# Patient Record
Sex: MALE | Race: WHITE
[De-identification: names, ages, dates, MRNs, and addresses within clinical notes are randomized per-mention and may not be internally consistent; named-entity substitution may affect disease eponyms.]

---

## 2017-02-24 ENCOUNTER — HOSPITAL ENCOUNTER (OUTPATIENT)
Dept: HOSPITAL 61 - PCVCIMAG | Age: 72
Discharge: HOME | End: 2017-02-24
Attending: INTERNAL MEDICINE
Payer: COMMERCIAL

## 2017-02-24 DIAGNOSIS — E66.9: ICD-10-CM

## 2017-02-24 DIAGNOSIS — E11.9: ICD-10-CM

## 2017-02-24 DIAGNOSIS — R10.84: ICD-10-CM

## 2017-02-24 DIAGNOSIS — G47.33: ICD-10-CM

## 2017-02-24 DIAGNOSIS — I10: Primary | ICD-10-CM

## 2017-02-24 PROCEDURE — 93306 TTE W/DOPPLER COMPLETE: CPT

## 2017-02-24 PROCEDURE — G0463 HOSPITAL OUTPT CLINIC VISIT: HCPCS

## 2017-05-26 ENCOUNTER — HOSPITAL ENCOUNTER (OUTPATIENT)
Dept: HOSPITAL 35 - RAD | Age: 72
End: 2017-05-26
Attending: INTERNAL MEDICINE
Payer: COMMERCIAL

## 2017-05-26 DIAGNOSIS — R10.9: Primary | ICD-10-CM

## 2017-06-06 ENCOUNTER — HOSPITAL ENCOUNTER (OUTPATIENT)
Dept: HOSPITAL 61 - PCVCCLINIC | Age: 72
Discharge: HOME | End: 2017-06-06
Attending: INTERNAL MEDICINE
Payer: COMMERCIAL

## 2017-06-06 DIAGNOSIS — R94.31: ICD-10-CM

## 2017-06-06 DIAGNOSIS — Z79.82: ICD-10-CM

## 2017-06-06 DIAGNOSIS — G47.33: ICD-10-CM

## 2017-06-06 DIAGNOSIS — I12.9: ICD-10-CM

## 2017-06-06 DIAGNOSIS — Z79.899: ICD-10-CM

## 2017-06-06 DIAGNOSIS — Z79.84: ICD-10-CM

## 2017-06-06 DIAGNOSIS — K55.1: Primary | ICD-10-CM

## 2017-06-06 DIAGNOSIS — N18.9: ICD-10-CM

## 2017-06-06 DIAGNOSIS — F17.200: ICD-10-CM

## 2017-06-06 DIAGNOSIS — E11.22: ICD-10-CM

## 2017-06-06 DIAGNOSIS — K21.9: ICD-10-CM

## 2017-06-06 DIAGNOSIS — R10.84: ICD-10-CM

## 2017-06-06 DIAGNOSIS — Z90.49: ICD-10-CM

## 2017-06-06 DIAGNOSIS — E78.00: ICD-10-CM

## 2017-06-06 DIAGNOSIS — I49.3: ICD-10-CM

## 2017-06-06 PROCEDURE — G0463 HOSPITAL OUTPT CLINIC VISIT: HCPCS

## 2017-06-06 PROCEDURE — 93005 ELECTROCARDIOGRAM TRACING: CPT

## 2017-06-06 PROCEDURE — 80061 LIPID PANEL: CPT

## 2017-06-19 ENCOUNTER — HOSPITAL ENCOUNTER (OUTPATIENT)
Dept: HOSPITAL 61 - PCVCINTER | Age: 72
Discharge: HOME | End: 2017-06-19
Attending: RADIOLOGY
Payer: COMMERCIAL

## 2017-06-19 DIAGNOSIS — I70.1: ICD-10-CM

## 2017-06-19 DIAGNOSIS — K55.1: ICD-10-CM

## 2017-06-19 DIAGNOSIS — K55.059: Primary | ICD-10-CM

## 2017-06-19 DIAGNOSIS — I10: ICD-10-CM

## 2017-06-19 PROCEDURE — C1876 STENT, NON-COA/NON-COV W/DEL: HCPCS

## 2017-06-19 PROCEDURE — 93458 L HRT ARTERY/VENTRICLE ANGIO: CPT

## 2017-06-19 PROCEDURE — C1769 GUIDE WIRE: HCPCS

## 2017-06-19 PROCEDURE — C1887 CATHETER, GUIDING: HCPCS

## 2017-06-19 PROCEDURE — C1751 CATH, INF, PER/CENT/MIDLINE: HCPCS

## 2017-06-19 PROCEDURE — 99153 MOD SED SAME PHYS/QHP EA: CPT

## 2017-06-19 PROCEDURE — 37236 OPEN/PERQ PLACE STENT 1ST: CPT

## 2017-06-19 PROCEDURE — 36252 INS CATH REN ART 1ST BILAT: CPT

## 2017-06-19 PROCEDURE — 36245 INS CATH ABD/L-EXT ART 1ST: CPT

## 2017-06-19 PROCEDURE — 99152 MOD SED SAME PHYS/QHP 5/>YRS: CPT

## 2017-06-19 PROCEDURE — 37237 OPEN/PERQ PLACE STENT EA ADD: CPT

## 2017-06-19 PROCEDURE — 75726 ARTERY X-RAYS ABDOMEN: CPT

## 2017-06-19 PROCEDURE — C1894 INTRO/SHEATH, NON-LASER: HCPCS

## 2017-06-19 PROCEDURE — C1725 CATH, TRANSLUMIN NON-LASER: HCPCS

## 2017-06-19 PROCEDURE — 76937 US GUIDE VASCULAR ACCESS: CPT

## 2017-06-19 NOTE — PCVCINTER
EXAM:

1. AORTOGRAM AND BILATERAL ILIOFEMORAL ANGIOGRAPHY

2. BILATERAL RENAL ANGIOGRAPHY

3. COMPLETE MESENTERIC ANGIOGRAPHY

4. INFERIOR MESENTERIC ARTERY STENT PLACEMENT

5. SUPERIOR MESENTERIC ARTERY STENT PLACEMENT



INDICATION: Chronic mesenteric ischemia. Postprandial abdominal pain.

Mesenteric atherosclerosis.. Hypertension. Renal atherosclerosis.



PROCEDURE: Procedure and risks of the procedures listed above were

discussed with the patient and consent obtained. Risks including but

not limited to bleeding, infection, stroke, vascular injury,

neurologic injury, embolization, allergic reactions, bowel ischemia

requiring resection, and contrast-induced nephropathy requiring

dialysis were discussed as appropriate and consent obtained. Patient

was placed on the angiography table. IV conscious sedation was

utilized with appropriate monitoring for 90 minutes. The right groin

was prepped and draped in the normal sterile fashion. Ultrasound was

used to interrogate the right groin and demonstrate the right common

femoral artery. An ultrasound image was saved. Under ultrasound

guidance a 21 gauge needle was used to gain access into the right

common femoral artery and a 5F vascular sheath was placed. Catheter

was placed into the suprarenal abdominal aorta and abdominal aortic

angiogram performed. Catheter was placed into the distal abdominal

aorta and bilateral iliofemoral angiography performed. Catheter was

placed into the right renal arteries and right renal angiograms

performed. Catheter was placed into the left renal arteries and left

renal angiograms performed.  Catheter was placed into the celiac axis

and celiac angiogram performed.  Catheter was placed in the superior

mesenteric artery and SMA angiogram performed.  Catheter was placed

into the inferior mesenteric artery and SERG angiogram performed.

Patient was given 4500 units of heparin. Groin sheath was upsized to 6

Turkish. I placed a 5 x 12 Palmaz blue stent across the area of

high-grade stenosis at the origin of the inferior mesenteric artery.

Follow-up angiogram was performed. I then placed a 7 x 15 Palmaz blue

stent across area of stenosis in the proximal superior mesenteric

artery with subsequent dilatation up to 7.5 mm. Dr. Coffey joined the

procedure and he performed coronary angiography.  Please see his

separate dictation for details.  Catheters and wires removed. Sheath

was removed and hemostasis obtained using the Mynx device. No

immediate complications.



FINDINGS:

Aortogram: There is one right and one left renal artery. The celiac

axis is patent. Moderate calcific plaque infrarenal abdominal aorta

without significant stenosis.

Bilateral iliofemoral angiography: The right and left common iliac and

external iliac arteries show good patency. The right and left internal

iliac arteries are patent. The right and left common femoral and

profunda femoral arteries are patent. The upper superficial femoral

arteries are patent.

Right renal artery: Moderate plaque proximal vessel does not cause

significant stenosis. No branch vessel stenosis.

Left renal artery: Mild plaque proximal vessel does not cause

significant stenosis. No branch vessel stenosis.

Celiac axis: Moderate calcific plaque proximal vessel does not cause

significant stenosis. Distal branches are patent. Celiac to SMA

collaterals are noted.

Superior mesenteric artery: Moderately extensive calcific plaque

proximal vessel results in 80% stenosis which is felt to be

flow-limiting. The more distal vessel is patent as are its distal

branches.

Inferior mesenteric artery: Moderate plaque at the origin the vessel

results in 90% stenosis. Distal branches are patent.

Superior mesenteric artery: Following stent placement as above vessel

shows satisfactory patency with only slight recoil of the stent

proximally in an area of heavy plaque.

Inferior mesenteric artery: Following stent placement vessel shows

satisfactory patency with only slight recoil of the stent proximally. 



IMPRESSION:

80% stenosis superior mesenteric artery was treated as above with

satisfactory patency restored.

90% stenosis at the origin of the inferior mesenteric artery was

treated as above with satisfactory patency restored.

The celiac axis shows satisfactory patency.

No significant aortoiliac or iliofemoral stenosis.



LOC:[id number]

## 2017-09-22 ENCOUNTER — HOSPITAL ENCOUNTER (OUTPATIENT)
Dept: HOSPITAL 61 - PCVCIMAG | Age: 72
Discharge: HOME | End: 2017-09-22
Attending: INTERNAL MEDICINE
Payer: COMMERCIAL

## 2017-09-22 DIAGNOSIS — I25.10: ICD-10-CM

## 2017-09-22 DIAGNOSIS — I73.9: ICD-10-CM

## 2017-09-22 DIAGNOSIS — Z95.828: ICD-10-CM

## 2017-09-22 DIAGNOSIS — I77.4: ICD-10-CM

## 2017-09-22 DIAGNOSIS — G47.33: ICD-10-CM

## 2017-09-22 DIAGNOSIS — K55.1: Primary | ICD-10-CM

## 2017-09-22 PROCEDURE — 93975 VASCULAR STUDY: CPT

## 2017-09-22 NOTE — PCVCIMAG
EXAM: MESENTERIC ARTERIAL DUPLEX



INDICATION: Mesenteric Atherosclerosis.



FINDINGS: 

Celiac Axis: 60-70% stenosis in the celiac axis.

Superior Mesenteric Artery: No flow limiting stenosis. No branch

vessel stenosis. Prior stent is patent.

Inferior Mesenteric Artery: No flow limiting stenosis. No branch

vessel stenosis. Prior stent is patent.



Mesenteric veins are patent where seen.



IMPRESSION: 

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

Previous superior and inferior mesenteric artery stents remain patent.



LOC:OFFICE

## 2017-10-20 ENCOUNTER — HOSPITAL ENCOUNTER (OUTPATIENT)
Dept: HOSPITAL 35 - GI | Age: 72
Discharge: HOME | End: 2017-10-20
Attending: SPECIALIST
Payer: COMMERCIAL

## 2017-10-20 VITALS — BODY MASS INDEX: 35 KG/M2 | HEIGHT: 70.98 IN | WEIGHT: 250 LBS

## 2017-10-20 DIAGNOSIS — I10: ICD-10-CM

## 2017-10-20 DIAGNOSIS — E11.9: ICD-10-CM

## 2017-10-20 DIAGNOSIS — Z87.891: ICD-10-CM

## 2017-10-20 DIAGNOSIS — K21.9: ICD-10-CM

## 2017-10-20 DIAGNOSIS — F41.9: ICD-10-CM

## 2017-10-20 DIAGNOSIS — E78.00: ICD-10-CM

## 2017-10-20 DIAGNOSIS — Z98.890: ICD-10-CM

## 2017-10-20 DIAGNOSIS — I73.9: ICD-10-CM

## 2017-10-20 DIAGNOSIS — F32.9: ICD-10-CM

## 2017-10-20 DIAGNOSIS — K31.9: Primary | ICD-10-CM

## 2017-10-20 PROCEDURE — 62110: CPT

## 2017-12-29 ENCOUNTER — HOSPITAL ENCOUNTER (OUTPATIENT)
Dept: HOSPITAL 61 - PCVCIMAG | Age: 72
Discharge: HOME | End: 2017-12-29
Attending: INTERNAL MEDICINE
Payer: COMMERCIAL

## 2017-12-29 DIAGNOSIS — I77.1: ICD-10-CM

## 2017-12-29 DIAGNOSIS — E11.22: ICD-10-CM

## 2017-12-29 DIAGNOSIS — Z95.828: ICD-10-CM

## 2017-12-29 DIAGNOSIS — I65.23: Primary | ICD-10-CM

## 2017-12-29 DIAGNOSIS — I12.9: ICD-10-CM

## 2017-12-29 DIAGNOSIS — N18.9: ICD-10-CM

## 2017-12-29 PROCEDURE — 93975 VASCULAR STUDY: CPT

## 2017-12-29 PROCEDURE — 93880 EXTRACRANIAL BILAT STUDY: CPT

## 2019-01-30 ENCOUNTER — HOSPITAL ENCOUNTER (OUTPATIENT)
Dept: HOSPITAL 61 - PCVCCLINIC | Age: 74
Discharge: HOME | End: 2019-01-30
Attending: INTERNAL MEDICINE
Payer: COMMERCIAL

## 2019-01-30 DIAGNOSIS — I25.10: Primary | ICD-10-CM

## 2019-01-30 DIAGNOSIS — I65.23: ICD-10-CM

## 2019-01-30 DIAGNOSIS — N18.9: ICD-10-CM

## 2019-01-30 DIAGNOSIS — E11.22: ICD-10-CM

## 2019-01-30 DIAGNOSIS — K55.1: ICD-10-CM

## 2019-01-30 DIAGNOSIS — Z79.899: ICD-10-CM

## 2019-01-30 DIAGNOSIS — E78.00: ICD-10-CM

## 2019-01-30 DIAGNOSIS — R10.9: ICD-10-CM

## 2019-01-30 DIAGNOSIS — Z87.891: ICD-10-CM

## 2019-01-30 DIAGNOSIS — G89.29: ICD-10-CM

## 2019-01-30 PROCEDURE — G0463 HOSPITAL OUTPT CLINIC VISIT: HCPCS

## 2019-01-30 PROCEDURE — 80061 LIPID PANEL: CPT

## 2019-01-30 PROCEDURE — 36415 COLL VENOUS BLD VENIPUNCTURE: CPT

## 2019-01-30 PROCEDURE — 93005 ELECTROCARDIOGRAM TRACING: CPT

## 2019-03-08 ENCOUNTER — HOSPITAL ENCOUNTER (OUTPATIENT)
Dept: HOSPITAL 61 - PCVCIMAG | Age: 74
Discharge: HOME | End: 2019-03-08
Attending: INTERNAL MEDICINE
Payer: COMMERCIAL

## 2019-03-08 DIAGNOSIS — E11.9: ICD-10-CM

## 2019-03-08 DIAGNOSIS — I10: ICD-10-CM

## 2019-03-08 DIAGNOSIS — E78.5: ICD-10-CM

## 2019-03-08 DIAGNOSIS — I65.23: Primary | ICD-10-CM

## 2019-03-08 DIAGNOSIS — I77.9: ICD-10-CM

## 2019-03-08 DIAGNOSIS — K55.1: ICD-10-CM

## 2019-03-08 DIAGNOSIS — I25.10: ICD-10-CM

## 2019-03-08 DIAGNOSIS — I25.84: ICD-10-CM

## 2019-03-08 DIAGNOSIS — E78.00: ICD-10-CM

## 2019-03-08 PROCEDURE — 93351 STRESS TTE COMPLETE: CPT

## 2019-03-08 PROCEDURE — 93975 VASCULAR STUDY: CPT

## 2019-03-08 PROCEDURE — 93325 DOPPLER ECHO COLOR FLOW MAPG: CPT

## 2019-03-08 PROCEDURE — 93880 EXTRACRANIAL BILAT STUDY: CPT

## 2019-03-08 NOTE — PCVCIMAG
EXAM: MESENTERIC ARTERIAL DUPLEX



INDICATION: Mesenteric Atherosclerosis.



FINDINGS: 

Celiac Axis: No flow limiting stenosis. No branch vessel stenosis.

Superior Mesenteric Artery: 50-60% restenosis proximal vessel with the

prior stent.  No branch vessel stenosis.

Inferior Mesenteric Artery: No flow limiting stenosis. No branch

vessel stenosis.



Mesenteric veins are patent where seen.



IMPRESSION: 

Celiac axis is patent.

50-60% restenosis proximal superior mesenteric artery within prior

stent not felt be critically flow-limiting.

Inferior mesenteric artery stent maintaining satisfactory patency.



LOC:[id number]

## 2019-03-08 NOTE — PCVCIMAG
--------------- APPROVED REPORT 

--------------





Indications

Stenosis



Doppler Spectral Velocity Analysis

PSV  /  EDVPSV  /  EDV

ECA (R)     96 / 14 cm/sECA (L)     105 / 8 cm/s



dICA (R)    58 / 14 cm/sdICA (L)     110 / 26 cm/s

Tolu (R)     76 / 20 cm/smICA (L)     98 / 19 cm/s

pICA (R)     111 / 12 cm/spICA (L)     129 / 15 cm/s



Bulb (R)        92 / 19 cm/sBulb (L)         137 / 21 cm/s



dCCA (R)     141 / 23 cm/sdCCA (L)     121 / 15 cm/s

mCCA (R)    131 / 16 cm/smCCA (L)    141 / 19 cm/s



Vert (R)     51 / 7 cm/sVert (L)     56 / 10 cm/s

ICA/CCA     0.79ICA/CCA     1.07



Findings

The right carotid bulb has moderate calcified plaque.

The right proximal internal carotid artery shows <40% 

stenosis.

The right common carotid artery shows no significant stenosis.

The right external carotid artery shows no significant stenosis.

The left carotid bulb has moderate calcified plaque.

The left proximal internal carotid artery shows 40-50% stenosis.

The left common carotid artery shows no significant stenosis.

The left external carotid artery shows no significant 

stenosis.



Conclusion

1.  Right internal carotid artery stenosis (<40%)

2.  Left internal carotid artery stenosis (40-50%)

3.  Antegrade vertebral flow

## 2019-03-11 NOTE — PCVCIMAG
--------------- APPROVED REPORT --------------





Study performed:  03/08/2019 09:41:23



Exam:  Stress Echocardiogram

Indication: CAD-moderate,dm, htn, hlp

Patient Location: Echo lab

Stress Nurse: Dorothy Stevens RN

Status: routine



Ht: 5 ft 11 in  

HR: 76 bpm      BP: 128/68 mmHg

Rhythm: NSR



Procedure

The patient underwent an Exercise Stress Test using the Kyree 

Protocol. Blood pressure, heart rate, and EKG were monitored.

An Echocardiogram was performed by technician in four stages in quad 

fashion.  At peak stress, four selected images were obtained and 

placed side by side with resting images for comparison.



Stress Test Details

Stress Test:  Exercise stress testing was performed using a Kyree 

protocol.

HR

Resting HR:            76 bpmMax Heart Rate (APMHR): 145 bpm 

Max HR Achieved:  93 bpmTarget HR (85% APMHR): 123 bpm

% of APMHR:         64

Recovery HR:            78 bpm

HR response to stress: Normal HR response to stress



BP

Resting BP:  128/68 mmHg

Max BP:       158/80 mmHg

Recovery BP:       140/80 mmHg

BP response to stress: Normal blood pressure response to 

stress.

ECG

Resting ECG:  Sinus Rhythm

Stress ECG:     Sinus Rhythm

ST Change: Normal

Arrhythmia:    None

Recovery ECG: Sinus Rhythm

Recovery ST Change: Normal

Recovery Arrhythmia: None



Clinical

Reason for Termination: Dyspnea, Maximal effort, leg fatigue

Stress Symptoms: limiting Dyspnea, leg fatigue

Exercise duration: 3 min 27 sec

Highest Stage Achieved: Stage 2: 2.5 mph at 12% grade. 

Exercise capacity: 5.6 METs

Overall Exercise Capacity for Age: Poor

Scale: Sedentary

Angina Score: None



Stress ECG Conclusion

Non-diagnostic exercise stress due to failure to attain target HR. 

Submaximal stress test.



Pre-Stress Echo

The resting Echocardiogram showed normal left ventricular 

contractility with an estimated Ejection Fraction of about 50-55%. 

Normal wall motion in all segments on baseline images.



Post-Stress Echo

The stress Echocardiogram showed normal left ventricular 

contractility with an estimated Ejection Fraction of about 60-65%. 

Normal augmentation of wall motion in all segments on post stress 

images.



Clinical

No clinical or ECG evidence for ischemia.



Conclusion

Clinical Response:  Non-ischemic

Exercise Capacity:  Below Average

Stress ECG Response:  Equivocal

Stress Echo Images:  Non-ischemic

Non-diagnostic study due to inability of the patient to achieve 85% 

of maximal HR. Limiting dyspnea and leg fatigue resulting in on 3:27 

mins of duration of exercise and peak heart rate of only 64% of 

target.



Other Information

Study Quality: Fair



<Conclusion>

Non-diagnostic study due to inability of the patient to achieve 85% 

of maximal HR. Limiting dyspnea and leg fatigue resulting in on 3:27 

mins of duration of exercise and peak heart rate of only 64% of 

target.

## 2019-10-01 ENCOUNTER — HOSPITAL ENCOUNTER (OUTPATIENT)
Dept: HOSPITAL 61 - PCVCCLINIC | Age: 74
Discharge: HOME | End: 2019-10-01
Attending: INTERNAL MEDICINE
Payer: COMMERCIAL

## 2019-10-01 DIAGNOSIS — E66.9: ICD-10-CM

## 2019-10-01 DIAGNOSIS — Z79.899: ICD-10-CM

## 2019-10-01 DIAGNOSIS — I65.23: ICD-10-CM

## 2019-10-01 DIAGNOSIS — Z79.82: ICD-10-CM

## 2019-10-01 DIAGNOSIS — I10: ICD-10-CM

## 2019-10-01 DIAGNOSIS — E11.9: ICD-10-CM

## 2019-10-01 DIAGNOSIS — R10.9: ICD-10-CM

## 2019-10-01 DIAGNOSIS — E78.00: ICD-10-CM

## 2019-10-01 DIAGNOSIS — I25.10: Primary | ICD-10-CM

## 2019-10-01 DIAGNOSIS — K21.9: ICD-10-CM

## 2019-10-01 DIAGNOSIS — K55.1: ICD-10-CM

## 2019-10-01 PROCEDURE — 93005 ELECTROCARDIOGRAM TRACING: CPT

## 2019-10-01 PROCEDURE — G0463 HOSPITAL OUTPT CLINIC VISIT: HCPCS

## 2019-10-01 PROCEDURE — 80061 LIPID PANEL: CPT

## 2019-10-01 PROCEDURE — 36415 COLL VENOUS BLD VENIPUNCTURE: CPT

## 2019-10-07 ENCOUNTER — HOSPITAL ENCOUNTER (OUTPATIENT)
Dept: HOSPITAL 61 - PCVCIMAG | Age: 74
Discharge: HOME | End: 2019-10-07
Attending: INTERNAL MEDICINE
Payer: COMMERCIAL

## 2019-10-07 DIAGNOSIS — K58.9: ICD-10-CM

## 2019-10-07 DIAGNOSIS — K55.1: Primary | ICD-10-CM

## 2019-10-07 DIAGNOSIS — E78.00: ICD-10-CM

## 2019-10-07 DIAGNOSIS — G89.29: ICD-10-CM

## 2019-10-07 PROCEDURE — 93975 VASCULAR STUDY: CPT

## 2019-10-07 NOTE — PCVCIMAG
EXAM: MESENTERIC ARTERIAL DUPLEX



INDICATION: Mesenteric Atherosclerosis.



FINDINGS: 

Celiac Axis: Mild stenosis. No branch vessel stenosis.

Superior Mesenteric Artery: 60-70% restenosis proximal/mid vessel

within prior stent.  No branch vessel stenosis.

Inferior Mesenteric Artery: No flow limiting stenosis. No branch

vessel stenosis.



Mesenteric veins are patent where seen.



IMPRESSION: 

60-70% restenosis proximal superior mesenteric artery within prior

stent.  Please correlate clinically.

Previous inferior mesenteric artery stent maintaining good patency.



LOC:[id number]

## 2019-11-06 ENCOUNTER — HOSPITAL ENCOUNTER (OUTPATIENT)
Dept: HOSPITAL 35 - CATH | Age: 74
Discharge: HOME | End: 2019-11-06
Attending: RADIOLOGY
Payer: COMMERCIAL

## 2019-11-06 VITALS — BODY MASS INDEX: 33.59 KG/M2 | WEIGHT: 248 LBS | HEIGHT: 72 IN

## 2019-11-06 VITALS — DIASTOLIC BLOOD PRESSURE: 62 MMHG | SYSTOLIC BLOOD PRESSURE: 138 MMHG

## 2019-11-06 DIAGNOSIS — K55.1: Primary | ICD-10-CM

## 2019-11-06 DIAGNOSIS — K55.059: ICD-10-CM

## 2019-11-06 DIAGNOSIS — Z90.49: ICD-10-CM

## 2019-11-06 DIAGNOSIS — Z87.891: ICD-10-CM

## 2019-11-06 DIAGNOSIS — E78.5: ICD-10-CM

## 2019-11-06 DIAGNOSIS — I10: ICD-10-CM

## 2019-11-06 DIAGNOSIS — E11.9: ICD-10-CM

## 2019-11-06 DIAGNOSIS — E78.00: ICD-10-CM

## 2019-11-06 DIAGNOSIS — I70.1: ICD-10-CM

## 2019-11-06 DIAGNOSIS — Z79.82: ICD-10-CM

## 2019-11-06 DIAGNOSIS — J44.9: ICD-10-CM

## 2019-11-06 DIAGNOSIS — Z79.899: ICD-10-CM

## 2019-11-06 DIAGNOSIS — Z98.890: ICD-10-CM

## 2019-11-06 DIAGNOSIS — I73.9: ICD-10-CM

## 2019-11-06 DIAGNOSIS — K21.9: ICD-10-CM

## 2019-11-06 DIAGNOSIS — F32.9: ICD-10-CM

## 2019-11-06 LAB
ANION GAP SERPL CALC-SCNC: 9 MMOL/L (ref 7–16)
BUN SERPL-MCNC: 21 MG/DL (ref 7–18)
CALCIUM SERPL-MCNC: 9.7 MG/DL (ref 8.5–10.1)
CHLORIDE SERPL-SCNC: 97 MMOL/L (ref 98–107)
CO2 SERPL-SCNC: 31 MMOL/L (ref 21–32)
CREAT SERPL-MCNC: 1.4 MG/DL (ref 0.7–1.3)
ERYTHROCYTE [DISTWIDTH] IN BLOOD BY AUTOMATED COUNT: 15 % (ref 10.5–14.5)
GLUCOSE SERPL-MCNC: 135 MG/DL (ref 74–106)
HCT VFR BLD CALC: 41.1 % (ref 42–52)
HGB BLD-MCNC: 13.5 GM/DL (ref 14–18)
MCH RBC QN AUTO: 28 PG (ref 26–34)
MCHC RBC AUTO-ENTMCNC: 32.7 G/DL (ref 28–37)
MCV RBC: 85.7 FL (ref 80–100)
PLATELET # BLD: 193 THOU/UL (ref 150–400)
POTASSIUM SERPL-SCNC: 4.2 MMOL/L (ref 3.5–5.1)
RBC # BLD AUTO: 4.8 MIL/UL (ref 4.5–6)
SODIUM SERPL-SCNC: 137 MMOL/L (ref 136–145)
WBC # BLD AUTO: 8.7 THOU/UL (ref 4–11)

## 2020-04-02 ENCOUNTER — HOSPITAL ENCOUNTER (OUTPATIENT)
Dept: HOSPITAL 35 - SJCVCIMAG | Age: 75
End: 2020-04-02
Attending: INTERNAL MEDICINE
Payer: COMMERCIAL

## 2020-04-02 DIAGNOSIS — I70.1: Primary | ICD-10-CM

## 2020-04-02 DIAGNOSIS — I65.29: ICD-10-CM

## 2020-05-07 ENCOUNTER — HOSPITAL ENCOUNTER (OUTPATIENT)
Dept: HOSPITAL 35 - SJCVCIMAG | Age: 75
End: 2020-05-07
Attending: INTERNAL MEDICINE
Payer: COMMERCIAL

## 2020-05-07 DIAGNOSIS — R06.09: ICD-10-CM

## 2020-05-07 DIAGNOSIS — E78.5: ICD-10-CM

## 2020-05-07 DIAGNOSIS — E11.9: ICD-10-CM

## 2020-05-07 DIAGNOSIS — Z87.891: ICD-10-CM

## 2020-05-07 DIAGNOSIS — Z79.82: ICD-10-CM

## 2020-05-07 DIAGNOSIS — I73.9: ICD-10-CM

## 2020-05-07 DIAGNOSIS — I25.10: Primary | ICD-10-CM

## 2020-05-07 DIAGNOSIS — I10: ICD-10-CM

## 2020-05-07 DIAGNOSIS — I25.84: ICD-10-CM

## 2020-05-14 ENCOUNTER — HOSPITAL ENCOUNTER (OUTPATIENT)
Dept: HOSPITAL 35 - CATH | Age: 75
Setting detail: OBSERVATION
LOS: 1 days | Discharge: HOME | End: 2020-05-15
Attending: INTERNAL MEDICINE | Admitting: INTERNAL MEDICINE
Payer: COMMERCIAL

## 2020-05-14 VITALS — HEIGHT: 70.98 IN | BODY MASS INDEX: 34.86 KG/M2 | WEIGHT: 249 LBS

## 2020-05-14 VITALS — DIASTOLIC BLOOD PRESSURE: 75 MMHG | SYSTOLIC BLOOD PRESSURE: 154 MMHG

## 2020-05-14 VITALS — DIASTOLIC BLOOD PRESSURE: 68 MMHG | SYSTOLIC BLOOD PRESSURE: 146 MMHG

## 2020-05-14 VITALS — SYSTOLIC BLOOD PRESSURE: 176 MMHG | DIASTOLIC BLOOD PRESSURE: 78 MMHG

## 2020-05-14 VITALS — SYSTOLIC BLOOD PRESSURE: 153 MMHG | DIASTOLIC BLOOD PRESSURE: 68 MMHG

## 2020-05-14 VITALS — SYSTOLIC BLOOD PRESSURE: 146 MMHG | DIASTOLIC BLOOD PRESSURE: 72 MMHG

## 2020-05-14 VITALS — DIASTOLIC BLOOD PRESSURE: 69 MMHG | SYSTOLIC BLOOD PRESSURE: 139 MMHG

## 2020-05-14 VITALS — DIASTOLIC BLOOD PRESSURE: 64 MMHG | SYSTOLIC BLOOD PRESSURE: 167 MMHG

## 2020-05-14 VITALS — DIASTOLIC BLOOD PRESSURE: 72 MMHG | SYSTOLIC BLOOD PRESSURE: 156 MMHG

## 2020-05-14 VITALS — SYSTOLIC BLOOD PRESSURE: 148 MMHG | DIASTOLIC BLOOD PRESSURE: 85 MMHG

## 2020-05-14 VITALS — DIASTOLIC BLOOD PRESSURE: 75 MMHG | SYSTOLIC BLOOD PRESSURE: 151 MMHG

## 2020-05-14 DIAGNOSIS — E78.00: ICD-10-CM

## 2020-05-14 DIAGNOSIS — I10: ICD-10-CM

## 2020-05-14 DIAGNOSIS — I65.29: ICD-10-CM

## 2020-05-14 DIAGNOSIS — I70.213: Primary | ICD-10-CM

## 2020-05-14 DIAGNOSIS — I25.10: ICD-10-CM

## 2020-05-14 DIAGNOSIS — E78.5: ICD-10-CM

## 2020-05-14 LAB
ANION GAP SERPL CALC-SCNC: 8 MMOL/L (ref 7–16)
BUN SERPL-MCNC: 26 MG/DL (ref 7–18)
CALCIUM SERPL-MCNC: 8.8 MG/DL (ref 8.5–10.1)
CHLORIDE SERPL-SCNC: 95 MMOL/L (ref 98–107)
CO2 SERPL-SCNC: 31 MMOL/L (ref 21–32)
CREAT SERPL-MCNC: 1.7 MG/DL (ref 0.7–1.3)
ERYTHROCYTE [DISTWIDTH] IN BLOOD BY AUTOMATED COUNT: 15 % (ref 10.5–14.5)
GLUCOSE SERPL-MCNC: 126 MG/DL (ref 74–106)
HCT VFR BLD CALC: 39.6 % (ref 42–52)
HGB BLD-MCNC: 13.3 GM/DL (ref 14–18)
MCH RBC QN AUTO: 28.3 PG (ref 26–34)
MCHC RBC AUTO-ENTMCNC: 33.5 G/DL (ref 28–37)
MCV RBC: 84.4 FL (ref 80–100)
PLATELET # BLD: 208 THOU/UL (ref 150–400)
POTASSIUM SERPL-SCNC: 4 MMOL/L (ref 3.5–5.1)
RBC # BLD AUTO: 4.69 MIL/UL (ref 4.5–6)
SODIUM SERPL-SCNC: 134 MMOL/L (ref 136–145)
WBC # BLD AUTO: 9.7 THOU/UL (ref 4–11)

## 2020-05-15 VITALS — DIASTOLIC BLOOD PRESSURE: 47 MMHG | SYSTOLIC BLOOD PRESSURE: 119 MMHG

## 2020-05-15 VITALS — DIASTOLIC BLOOD PRESSURE: 78 MMHG | SYSTOLIC BLOOD PRESSURE: 107 MMHG

## 2020-05-15 VITALS — SYSTOLIC BLOOD PRESSURE: 107 MMHG | DIASTOLIC BLOOD PRESSURE: 78 MMHG

## 2020-05-15 VITALS — DIASTOLIC BLOOD PRESSURE: 66 MMHG | SYSTOLIC BLOOD PRESSURE: 167 MMHG

## 2020-05-15 LAB
ALBUMIN SERPL-MCNC: 3.7 G/DL (ref 3.4–5)
ALT SERPL-CCNC: 24 U/L (ref 30–65)
ANION GAP SERPL CALC-SCNC: 8 MMOL/L (ref 7–16)
AST SERPL-CCNC: 18 U/L (ref 15–37)
BILIRUB SERPL-MCNC: 0.5 MG/DL
BUN SERPL-MCNC: 27 MG/DL (ref 7–18)
CALCIUM SERPL-MCNC: 8.4 MG/DL (ref 8.5–10.1)
CHLORIDE SERPL-SCNC: 101 MMOL/L (ref 98–107)
CO2 SERPL-SCNC: 30 MMOL/L (ref 21–32)
CREAT SERPL-MCNC: 1.8 MG/DL (ref 0.7–1.3)
ERYTHROCYTE [DISTWIDTH] IN BLOOD BY AUTOMATED COUNT: 14.9 % (ref 10.5–14.5)
GLUCOSE SERPL-MCNC: 232 MG/DL (ref 74–106)
HCT VFR BLD CALC: 37.7 % (ref 42–52)
HGB BLD-MCNC: 12.7 GM/DL (ref 14–18)
MCH RBC QN AUTO: 28.4 PG (ref 26–34)
MCHC RBC AUTO-ENTMCNC: 33.6 G/DL (ref 28–37)
MCV RBC: 84.5 FL (ref 80–100)
PLATELET # BLD: 169 THOU/UL (ref 150–400)
POTASSIUM SERPL-SCNC: 3.9 MMOL/L (ref 3.5–5.1)
PROT SERPL-MCNC: 6.8 G/DL (ref 6.4–8.2)
RBC # BLD AUTO: 4.47 MIL/UL (ref 4.5–6)
SODIUM SERPL-SCNC: 139 MMOL/L (ref 136–145)
TROPONIN I SERPL-MCNC: 0.2 NG/ML (ref ?–0.06)
WBC # BLD AUTO: 9.1 THOU/UL (ref 4–11)

## 2020-05-15 NOTE — EKG
[facility]
[place]   [address]                     ELECTROCARDIOGRAM REPORT      
_______________________________________________________________________________
 
Name:       KEYANA GOINS              Room #:         219-[facility] 
M.R.#:      [id number]                       Account #:      [id number]  
Admission:  20    Attend Phys:    Toan Bell MD,
Discharge:              Date of Birth:  [birth year]  
                                                          Report #: [id number]
                                                                    [id number]
_______________________________________________________________________________
THIS REPORT FOR:  
 
cc:  Ana Cristina Vasquez MD, Jennifer S. MD Couchonnal, Luis F. MD                                            ~
THIS REPORT FOR:   //name//                          
 
                          [facility]
                                       
Test Date:    2020               Test Time:    11:03:16
Pat Name:     KEYANA GOINS           Department:   
Patient ID:   [id number]            Room:         219
Gender:       M                        Technician:   KHUSHBU CONNELLY
:          1945               Requested By: Toan Bell
Order Number: [id number] MD:   Quirino Castro
                                 Measurements
Intervals                              Axis          
Rate:         51                       P:            55
VT:           208                      QRS:          -8
QRSD:         93                       T:            79
QT:           455                                    
QTc:          420                                    
                           Interpretive Statements
Sinus rhythm
Inferior infarct, old
Lateral leads are also involved
Compared to ECG 2016 08:54:06
Myocardial infarct finding now present
 
Electronically Signed On 5- 8:18:40 CDT by Quirino Castro
[email or web address]
 
 
 
 
 
 
 
 
 
 
 
 
 
  <ELECTRONICALLY SIGNED>
   By: Quirino Castro MD        
  05/15/20     0818
D: 20                           _____________________________________
T: 20 1103                           Quirino Castro MD          /EPI

## 2020-05-17 NOTE — CATHLAB
[facility]
[address] [place]   [address]                   INVASIVE PROCEDURE REPORT     
_______________________________________________________________________________
 
Name:       KEYANA GOINS              Room #:         219-P       [facility] Prem JACOBS#:      [id number]                       Account #:      [id number]  
Admission:  05/14/20    Attend Phys:    Toan Bell MD,
Discharge:  05/15/20    Date of Birth:  [birth year]  
                                                          Report #: [id number]
                                                                    [id number]
_______________________________________________________________________________
THIS REPORT FOR:  
 
cc:  Ana Cristina Vasquez MD, Jennifer S. MD Mancuso, Gerald M. MD [facility]                                        
                                                                       ~
 
--------------- APPROVED REPORT --------------
 
 
Study performed:  05/14/2020 08:45:53
 
Patient Details
Patient Status: Out-Patient                  Room #: 
The patient is a 74 year-old male
 
Event Personnel
Toan Bell  Interventional Cardiologist, Mercedes Faulkner RN RN, 
Lorena Muñoz RTR, Linwood Thomas Ja'net RTR 
Monitor
 
Procedures Performed
Left Heart Cath w/or w/o Coronaries [id number] [facility] SAMEERA Place w/wo Plasty 
Single RCA 396813 Art Access - R femoral artery*  01787 Initial Mod 
Sed Same Phys/QHP Gr5y 418781 39597 Mod Sed Same Phys/QHP Ea 
[id number]
 
Indication
Chest pain
 
Procedure Narrative
The patient was brought electively to the Cardiac Catheterization 
Laboratory and was prepped and draped in a sterile manner. A SHEATH 
BRITE-TIP 6F X 11CM ([id number]) sheath was inserted into the RFA^. 
Coronary angiography was performed using coronary diagnostic 
catheters. The right coronary system was accessed and visualized with 
a JR 4 catheter. The left coronary system was accessed and visualized 
with a JL4 catheter. The left ventricle was accessed and visualized 
with a PIGTAIL catheter. Closure device was deployed with a Fr 
MYNXGRIP 6/7F #[id number]. The patient tolerated the procedure well and 
there were no complications associated with the procedure. There was 
no hematoma.
 
Intraoperative Conscious Sedation
Sedation start time:  8:06           Case end Time:  
10:19    
 
 
[facility]
[place]  [address]
Phone:  [phone]                    INVASIVE PROCEDURE REPORT     
_______________________________________________________________________________
 
Name:            KEYANA GOINS              Room #:        219-P       [facility] [place]
[facility]#:           [id number]          Account #:     [id number]  
Admission:       05/14/20         Attend Phys:   Toan Bell,
Discharge:    05/15/20      Date of Birth: [birth year]  
                         Report #:      [id number]
        [id number]
_______________________________________________________________________________
Fentanyl  50 mcg    Versed  4 mg  
This was a combo case and for this part they used 1mg of Versed.  The 
flouro time was 7:56mins and 120ml of Visipaque.    
 
Fluoro Time:    12.05 minutes     
Dose:     DAP 93693.40 cGycm2  1919 mGy  
Contrast Type and Amount:  Visipaque 184 ml    
 
Hemodynamics
The aortic pressure is 130/54 mmHg with a mean of 87 mmHg. The left 
ventricular pressure is 137/10 mmHg with a mean of mmHg. The left 
ventricular end diastolic pressure is 24 mmHg. 
 
PCI Technique Lesion
Anticoagulation was achieved with Heparin. Patient was preloaded with 
Heparin IV 3000 units. Percutaneous coronary intervention was 
performed on the proximal right coronary artery. A LAUNCHER 6FR GRACE 
90CM #[id number] Guide Catheter was used to engage the RCA ostium. A Luge 
Wire .014 x 182CM #[id number] Interventional Guidewire was used to cross 
the lesion.
 
BALLOON DILATION
A Balloon catheter Sprinter OTW 2.5 x 12 #[id number] was inserted and 
inflated up to 12.00atm for 31seconds. Additional Inflation: 16.00atm 
for 28seconds.
 
STENT DEPLOYMENT
A stent RESOLUTE MEGHA RX 2.5 X 12 #[id number] was inserted and inflated 
up to 16.00atm for 27seconds.
 
POST STENT DEPLOYMENT BALLOON DILATION
A Balloon catheter TREK NC OTW 2.75 X 12 #[id number] was inserted and 
inflated up to 18.00atm for 24seconds. Additional Inflation: 20.00atm 
for 26seconds.
 
Conclusion
#1.  Successful PTCA stent of a high-grade proximal RCA lesion 
moderately calcified.  95% to 0% with placement of a 2.5 x 12 Megha 
drug-eluting stent postdilated noncompliant high-pressure balloon 2.8 
mm JASMIN grade III flow.  50 and 60% mid vessel lesions exist will 
continue to follow dominant vessel.
#2 moderately calcified left main Short mildly disease giving rise to 
LAD and circumflex.
#3 moderate diffuse disease in an LAD which wraps around.  Appears to 
be a previously placed proximal stent with mild in-stent restenosis 
in 40 and 50% diffuse disease in the mid vessel.
 
 
[facility]
[address] [place]  [address]
Phone:  [phone]                    INVASIVE PROCEDURE REPORT     
_______________________________________________________________________________
 
Name:            KEYANA GOINS              Room #:        219-P       [facility] [place]
M.R.#:           [id number]          Account #:     [id number]  
Admission:       05/14/20         Attend Phys:   Toan Bell,
Discharge:    05/15/20      Date of Birth: [birth year]  
                         Report #:      [id number]
        [id number]
_______________________________________________________________________________
#4 circumflex OM is nondominant but large in size eccentric lesions 
of 50 to 60% tandem moderate OM system which is widely patent no 
occlusive disease
#5 hyperdynamic LV function EF 65%
 
Recommendations and plan: Continue aggressive risk factor 
modification.  Dual antiplatelet therapy to be continued.  Patient 
hemodynamically stable and pain-free.  Transferred to CCU to follow 
post stent protocol.
Patient had mesenteric angiography today per Dr. El see his 
dictation.
 
 
 
 
 
 
 
 
 
 
 
 
 
 
 
 
 
 
 
 
 
 
 
 
 
 
 
 
 
 
 
 
 
  <ELECTRONICALLY SIGNED>
   By: Toan Bell MD, FACC   
  05/17/20     1043
D: 05/17/20 1043                           _____________________________________
T: 05/17/20 1043                           Toan Bell MD, FACC     /INF

## 2020-05-18 NOTE — EKG
[facility]
[place]   [address]                     ELECTROCARDIOGRAM REPORT      
_______________________________________________________________________________
 
Name:       KEYANA GOINS              Room #:         219-\[facility]\""..#:      [id number]                       Account #:      [id number]  
Admission:  20    Attend Phys:    Toan Bell MD,
Discharge:  05/15/20    Date of Birth:  [birth year]  
                                                          Report #: [id number]
                                                                    [id number]
_______________________________________________________________________________
THIS REPORT FOR:  
 
cc:  Ana Cristina Vasquez MD, Jennifer S. MD Lundgren,Noah GUILLEN MD [facility]                                        ~
THIS REPORT FOR:   //name//                          
 
                          [facility]
                                       
Test Date:    2020-05-15               Test Time:    09:35:18
Pat Name:     KEYANA GOINS           Department:   
Patient ID:   [id number]            Room:         219 P
Gender:       M                        Technician:   JJ
:          1945               Requested By: Toan Bell
Order Number: [id number] MD:   Noah Braswell
                                 Measurements
Intervals                              Axis          
Rate:         66                       P:            40
MD:           187                      QRS:          -18
QRSD:         102                      T:            60
QT:           426                                    
QTc:          447                                    
                           Interpretive Statements
Sinus rhythm
Inferior infarct, old
Compared to ECG 2020 11:03:16
No significant changes
 
Electronically Signed On 2020 7:48:15 CDT by Noah Braswell
[email or web address]
 
 
 
 
 
 
 
 
 
 
 
 
 
 
  <ELECTRONICALLY SIGNED>
   By: Noah Braswell MD, FAC   
  20     0748
D: 05/15/20 0935                           _____________________________________
T: 05/15/20 0935                           Noah Braswell MD, FAC     /EPI

## 2020-10-05 ENCOUNTER — HOSPITAL ENCOUNTER (OUTPATIENT)
Dept: HOSPITAL 35 - SJCVC | Age: 75
End: 2020-10-05
Attending: INTERNAL MEDICINE
Payer: COMMERCIAL

## 2020-10-05 DIAGNOSIS — I77.9: ICD-10-CM

## 2020-10-05 DIAGNOSIS — E66.9: ICD-10-CM

## 2020-10-05 DIAGNOSIS — E78.00: ICD-10-CM

## 2020-10-05 DIAGNOSIS — G89.29: ICD-10-CM

## 2020-10-05 DIAGNOSIS — E11.22: ICD-10-CM

## 2020-10-05 DIAGNOSIS — I25.10: Primary | ICD-10-CM

## 2020-10-05 DIAGNOSIS — I12.9: ICD-10-CM

## 2020-10-05 DIAGNOSIS — K55.1: ICD-10-CM

## 2020-10-05 DIAGNOSIS — R10.9: ICD-10-CM

## 2020-10-05 DIAGNOSIS — Z79.899: ICD-10-CM

## 2020-10-05 DIAGNOSIS — N18.9: ICD-10-CM

## 2020-10-05 DIAGNOSIS — Z79.82: ICD-10-CM

## 2020-10-05 DIAGNOSIS — Z87.891: ICD-10-CM

## 2021-05-25 ENCOUNTER — HOSPITAL ENCOUNTER (OUTPATIENT)
Dept: HOSPITAL 35 - SJCVCIMAG | Age: 76
End: 2021-05-25
Attending: INTERNAL MEDICINE
Payer: COMMERCIAL

## 2021-05-25 DIAGNOSIS — E78.00: ICD-10-CM

## 2021-05-25 DIAGNOSIS — G47.33: ICD-10-CM

## 2021-05-25 DIAGNOSIS — I45.10: ICD-10-CM

## 2021-05-25 DIAGNOSIS — Z79.84: ICD-10-CM

## 2021-05-25 DIAGNOSIS — Z87.891: ICD-10-CM

## 2021-05-25 DIAGNOSIS — E11.22: ICD-10-CM

## 2021-05-25 DIAGNOSIS — R94.31: Primary | ICD-10-CM

## 2021-05-25 DIAGNOSIS — K21.9: ICD-10-CM

## 2021-05-25 DIAGNOSIS — I25.10: ICD-10-CM

## 2021-05-25 DIAGNOSIS — N18.9: ICD-10-CM

## 2021-05-25 DIAGNOSIS — I12.9: ICD-10-CM

## 2021-05-25 DIAGNOSIS — J98.6: ICD-10-CM

## 2021-05-25 DIAGNOSIS — E66.9: ICD-10-CM

## 2021-05-25 DIAGNOSIS — I65.23: ICD-10-CM

## 2021-05-25 DIAGNOSIS — G89.29: ICD-10-CM

## 2021-05-25 DIAGNOSIS — R10.9: ICD-10-CM

## 2021-05-25 DIAGNOSIS — K55.1: ICD-10-CM

## 2021-05-25 DIAGNOSIS — Z79.899: ICD-10-CM
